# Patient Record
Sex: FEMALE | Race: BLACK OR AFRICAN AMERICAN | NOT HISPANIC OR LATINO | URBAN - METROPOLITAN AREA
[De-identification: names, ages, dates, MRNs, and addresses within clinical notes are randomized per-mention and may not be internally consistent; named-entity substitution may affect disease eponyms.]

---

## 2021-08-25 ENCOUNTER — APPOINTMENT (RX ONLY)
Dept: URBAN - METROPOLITAN AREA CLINIC 50 | Facility: CLINIC | Age: 51
Setting detail: DERMATOLOGY
End: 2021-08-25

## 2021-08-25 ENCOUNTER — APPOINTMENT (RX ONLY)
Dept: URBAN - METROPOLITAN AREA CLINIC 49 | Facility: CLINIC | Age: 51
Setting detail: DERMATOLOGY
End: 2021-08-25

## 2021-08-25 DIAGNOSIS — L21.8 OTHER SEBORRHEIC DERMATITIS: ICD-10-CM

## 2021-08-25 PROCEDURE — ? COUNSELING

## 2021-08-25 PROCEDURE — ? TREATMENT REGIMEN

## 2021-08-25 PROCEDURE — 99203 OFFICE O/P NEW LOW 30 MIN: CPT

## 2021-08-25 PROCEDURE — ? PRESCRIPTION

## 2021-08-25 RX ORDER — KETOCONAZOLE 20 MG/G
THIN LAYER CREAM TOPICAL BID
Qty: 1 | Refills: 2 | Status: ERX | COMMUNITY
Start: 2021-08-25

## 2021-08-25 RX ORDER — KETOCONAZOLE 20 MG/ML
THIN LAYER SHAMPOO, SUSPENSION TOPICAL WEEKLY
Qty: 1 | Refills: 3 | Status: ERX | COMMUNITY
Start: 2021-08-25

## 2021-08-25 RX ADMIN — KETOCONAZOLE THIN LAYER: 20 CREAM TOPICAL at 00:00

## 2021-08-25 RX ADMIN — KETOCONAZOLE THIN LAYER: 20 SHAMPOO, SUSPENSION TOPICAL at 00:00

## 2021-08-25 ASSESSMENT — LOCATION DETAILED DESCRIPTION DERM
LOCATION DETAILED: RIGHT CENTRAL BUCCAL CHEEK
LOCATION DETAILED: RIGHT MEDIAL FRONTAL SCALP
LOCATION DETAILED: RIGHT CENTRAL BUCCAL CHEEK
LOCATION DETAILED: RIGHT MEDIAL FRONTAL SCALP

## 2021-08-25 ASSESSMENT — LOCATION SIMPLE DESCRIPTION DERM
LOCATION SIMPLE: RIGHT SCALP
LOCATION SIMPLE: RIGHT SCALP
LOCATION SIMPLE: RIGHT CHEEK
LOCATION SIMPLE: RIGHT CHEEK

## 2021-08-25 ASSESSMENT — LOCATION ZONE DERM
LOCATION ZONE: SCALP
LOCATION ZONE: SCALP
LOCATION ZONE: FACE
LOCATION ZONE: FACE

## 2021-08-25 NOTE — PROCEDURE: TREATMENT REGIMEN
Continue Regimen: Hydrating cleanser BID
Initiate Treatment: Ketoconazole 2% shampoo once or twice weekly to scalp\\nKetoconazole 2% cream BID to face
Detail Level: Zone
Otc Regimen: SPF QAM
Samples Given: Cetaphil lotion \\nCetaphil cleanser \\nLa Roche posay UV double repair cream
Discontinue Regimen: Zinc therapy soap \\nT-Sal shampoo

## 2021-08-25 NOTE — PROCEDURE: TREATMENT REGIMEN
Discontinue Regimen: Zinc therapy soap \\nT-Sal shampoo
Initiate Treatment: Ketoconazole 2% shampoo once or twice weekly to scalp\\nKetoconazole 2% cream BID to face
Continue Regimen: Hydrating cleanser BID
Samples Given: Cetaphil lotion \\nCetaphil cleanser \\nLa Roche posay UV double repair cream
Otc Regimen: SPF QAM
Detail Level: Zone

## 2025-02-19 NOTE — PROCEDURE: COUNSELING
[facility]  [address], [address]  [place] [address]    [facility]  [facility]    NAME: Sole Peralta  AGE: 89 y.o. SEX: female    DATE OF ENCOUNTER: 2/19/2025    Assessment and Plan     Vertigo  Stable, no symptoms for years.    Permanent atrial fibrillation (HCC)  HR 97 in the office today  Continue Coreg 12.5 mg twice daily.  Anticoagulation with Xarelto 15 mg daily  f/u with cardiology as scheduled in June.   Repeat TSH was WNL 4.63 (1/21/2025).    Primary hypertension  Currently on Coreg 12.5 mg daily in setting A-fib  /76 in the office today  Continue Coreg 12.5 mg daily  Continue monitoring BP.  Avoid hypotension    Gastroesophageal reflux disease  Maintained on Tums on an as-needed basis.    Acquired hypothyroidism  Stable, TSH WNL 4.63 (1/21/25)  Continue current dose of levothyroxine 25 mcg daily  Monitor TSH.     Type 2 diabetes mellitus (HCC)  Not currently on any medications   Hemoglobin A1c 7.41 (11/26/24), acceptable given advanced age, comorbidities, assisted living  Continue carb control diet and exercise daily.     - Counseling Documentation: patient was counseled regarding: instructions for management and patient and family education  Patient Instructions   Your blood pressure is good today. Continue same dose of medications.  Your thyroid hormone was in the normal range. We continue same dose of levothyroxine.  Please get your blood works done in May and I will see you in 3 months.  Call my office for any questions or concerns.   Chief Complaint     Chief Complaint   Patient presents with    Follow-up     History of Present Illness   The patient presents to the office today for f/u her BP and HR. States her HR has been always high and her cardiologist aware of that. Denies dizziness, HA, CP, SOB, or palpitations. Denies joint pain or swollen joint.  C/o gaining weight. Concerns about her diabetes, inquiring  "whether she should start medications.     The following portions of the patient's history were reviewed and updated as appropriate: allergies, current medications, past family history, past medical history, past social history, past surgical history and problem list.    Review of Systems     Review of Systems   HENT:  Negative for trouble swallowing.    Eyes:  Positive for visual disturbance (uses glasses).   Respiratory:  Negative for shortness of breath.    Cardiovascular:  Negative for chest pain and palpitations.   Gastrointestinal:  Negative for abdominal pain.   Genitourinary:  Negative for dysuria.   Neurological:  Negative for headaches.       Objective     /76 (BP Location: Left arm, Patient Position: Sitting, Cuff Size: Standard)   Pulse 97   Temp 97.7 °F (36.5 °C) (Temporal)   Ht 5' 1\" (1.549 m)   SpO2 99%   BMI 30.16 kg/m²     Physical Exam  Vitals and nursing note reviewed.   General: no acute distress.  HENT:      Head: Normocephalic.      Nose: Nose normal.      Mouth: Mucous membranes are moist.   Eyes:       Right eye: No discharge.         Left eye: No discharge.      Conjunctivae normal.   Cardiovascular:      Normal rate and irregular rhythm, 96 bpm. No murmur heard.  Pulmonary:      Pulmonary effort is normal. No wheezing, rhonchi or rales.   Abdominal:      Bowel sounds are normal. There is no distension.      Abdomen is soft. There is no abdominal tenderness.   Musculoskeletal:      Right lower leg: No edema.      Left lower leg: No edema.   Skin:     General: Skin is warm.   Neurological: alert.      Oriented to person, place, and time. Cooperative, follow commands      Behavior: normal.      Labs & Imaging:  Lab Results   Component Value Date    WBC 4.76 03/22/2021    HGB 13.9 03/22/2021    HCT 41.7 03/22/2021    MCV 89 03/22/2021     03/22/2021     Lab Results   Component Value Date    SODIUM 134 (L) 03/22/2021    K 3.8 03/22/2021     03/22/2021    CO2 23 03/22/2021 "    AGAP 9 03/22/2021    BUN 26 (H) 03/22/2021    CREATININE 1.21 03/22/2021    GLUC 93 03/22/2021    CALCIUM 8.7 03/22/2021    AST 44 03/21/2021    ALT 28 03/21/2021    ALKPHOS 61 03/21/2021    TP 7.5 03/21/2021    TBILI 0.35 03/21/2021    EGFR 38 03/22/2021     Lab Results   Component Value Date    HGBA1C 6.8 12/12/2023     Lab Results   Component Value Date    [id number] 4.064 (H) 03/20/2021    TSH 4.63 01/21/2025     Current Medications     Current Outpatient Medications:     atorvastatin (LIPITOR) 20 mg tablet, Take 1 tablet (20 mg total) by mouth daily at bedtime, Disp: 90 tablet, Rfl: 1    calcium carbonate (OS-ESTEBAN) 600 MG tablet, Take 600 mg by mouth daily, Disp: , Rfl:     carvedilol (COREG) 12.5 mg tablet, Take 12.5 mg by mouth 2 (two) times a day with meals, Disp: , Rfl:     cholecalciferol (VITAMIN D3) 1,000 units tablet, Take 1,000 Units by mouth daily, Disp: , Rfl:     latanoprost (XALATAN) 0.005 % ophthalmic solution, Administer 1 drop to both eyes daily at bedtime, Disp: , Rfl:     levothyroxine 25 mcg tablet, Take 1 tablet by mouth daily, Disp: 90 tablet, Rfl: 1    loratadine (CLARITIN) 10 mg tablet, Daily, Disp: , Rfl:     rivaroxaban (Xarelto) 15 mg tablet, Take 1 tablet (15 mg total) by mouth daily with breakfast, Disp: 90 tablet, Rfl: 1    Blood Pressure Monitoring (ADULT BLOOD PRESSURE CUFF LG) KIT, by Does not apply route once for 1 dose, Disp: 1 each, Rfl: 0    Health Maintenance     Health Maintenance   Topic Date Due    Kidney Health Evaluation: Albumin/Creatinine Ratio  Never done    Diabetic Foot Exam  Never done    Diabetic Eye Exam  Never done    BMI: Followup Plan  Never done    Zoster Vaccine (1 of 2) Never done    RSV Vaccine for Pregnant Patients and Patients Age 60+ Years (1 - 1-dose 75+ series) Never done    Kidney Health Evaluation: GFR  03/22/2022    HEMOGLOBIN A1C  06/12/2024    Influenza Vaccine (1) 09/01/2024    COVID-19 Vaccine (4 - 2024-25 season) 09/01/2024    Depression  Screening  07/17/2025    Fall Risk  07/17/2025    Urinary Incontinence Screening  07/17/2025    Medicare Annual Wellness Visit (AWV)  07/17/2025    BMI: Adult  01/15/2026    Pneumococcal Vaccine: 65+ Years  Completed    Meningococcal B Vaccine  Aged Out    RSV Vaccine age 0-20 Months  Aged Out    HIB Vaccine  Aged Out    IPV Vaccine  Aged Out    Hepatitis A Vaccine  Aged Out    Meningococcal ACWY Vaccine  Aged Out    HPV Vaccine  Aged Out     Immunization History   Administered Date(s) Administered    COVID-19 MODERNA VACC 0.5 ML IM 06/29/2021, 07/27/2021, 12/02/2021    INFLUENZA 10/10/2022, 10/09/2023    Influenza Split High Dose Preservative Free IM 10/15/2019    Influenza, high dose seasonal 0.7 mL 09/15/2020, 10/14/2021, 10/04/2023    Pneumococcal Conjugate 13-Valent 09/01/2016    Pneumococcal Polysaccharide PPV23 09/01/2017      Detail Level: Zone